# Patient Record
Sex: MALE | Race: WHITE
[De-identification: names, ages, dates, MRNs, and addresses within clinical notes are randomized per-mention and may not be internally consistent; named-entity substitution may affect disease eponyms.]

---

## 2019-10-11 ENCOUNTER — HOSPITAL ENCOUNTER (EMERGENCY)
Dept: HOSPITAL 4 - SED | Age: 49
Discharge: STILL ADMITTED OR ADMINISTRATIVE | End: 2019-10-11
Payer: COMMERCIAL

## 2019-10-11 VITALS — WEIGHT: 198 LBS | HEIGHT: 64 IN | BODY MASS INDEX: 33.8 KG/M2

## 2019-10-11 VITALS — SYSTOLIC BLOOD PRESSURE: 136 MMHG

## 2019-10-11 DIAGNOSIS — S61.012A: Primary | ICD-10-CM
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
## 2019-10-11 PROCEDURE — 99283 EMERGENCY DEPT VISIT LOW MDM: CPT

## 2019-10-11 PROCEDURE — 12002 RPR S/N/AX/GEN/TRNK2.6-7.5CM: CPT

## 2019-10-11 NOTE — NUR
Patient triaged and placed in waiting room. VSS and patient appears in no acute 
distress at this time. Accompanied by son, awaiting available bed, and MD 
notified of need for MSE.

## 2019-10-11 NOTE — NUR
Patient given written and verbal discharge instructions and verbalizes 
understanding.  ER MD Dr. Mccartney discussed with patient the results and 
treatment provided. Patient in stable condition. ID arm band removed.

Rx of norco and zofran given. Patient educated on pain management and to follow 
up with PMD. Pain Scale 0/10.

Opportunity for questions provided and answered. Medication side effect fact 
sheet provided.

## 2019-10-11 NOTE — NUR
Pt came to the ED for laceration to L thumb. Pt cut his finger while doing work 
at home with a hack saw. Currently bleeding is controlled. Pain is 7/10. Denies 
n/v/d or fever. No other complaints/injuries noted at this time. WIll cont. to 
monitor.

## 2019-10-11 NOTE — NUR
-------------------------------------------------------------------------------

           *** Note undone in EDM - 10/12/19 at 0736 by [email or web address] ***            

-------------------------------------------------------------------------------

Pt came to the ED for laceration to L thumb. Pt cut his finger while doing work 
at home with a hack saw. Currently bleeding is controlled. Pain is 7/10. Denies 
n/v/d or fever. No other complaints/injuries noted at this time. WIll cont. to 
monitor.

## 2022-11-05 ENCOUNTER — HOSPITAL ENCOUNTER (EMERGENCY)
Dept: HOSPITAL 4 - SED | Age: 52
Discharge: HOME | End: 2022-11-05
Payer: COMMERCIAL

## 2022-11-05 VITALS — SYSTOLIC BLOOD PRESSURE: 111 MMHG

## 2022-11-05 VITALS — BODY MASS INDEX: 31.58 KG/M2 | WEIGHT: 185 LBS | HEIGHT: 64 IN

## 2022-11-05 VITALS — SYSTOLIC BLOOD PRESSURE: 109 MMHG
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
## 2022-11-05 DIAGNOSIS — S92.422A: Primary | ICD-10-CM
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
## 2022-11-05 DIAGNOSIS — Z79.899: ICD-10-CM
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
## 2022-11-05 PROCEDURE — 73660 X-RAY EXAM OF TOE(S): CPT

## 2022-11-05 PROCEDURE — 99283 EMERGENCY DEPT VISIT LOW MDM: CPT

## 2022-11-05 NOTE — NUR
Patient given written and verbal discharge instructions and verbalizes 
understanding.  ER MD discussed with patient the results and treatment 
provided. Patient in stable condition. ID arm band removed. 

Rx of IBUPROPEN, NORCO given. Patient educated on pain management and to follow 
up with PMD. Pain Scale 3/10.

Opportunity for questions provided and answered. Medication side effect fact 
sheet provided.